# Patient Record
Sex: MALE | Race: WHITE
[De-identification: names, ages, dates, MRNs, and addresses within clinical notes are randomized per-mention and may not be internally consistent; named-entity substitution may affect disease eponyms.]

---

## 2019-08-28 ENCOUNTER — HOSPITAL ENCOUNTER (OUTPATIENT)
Dept: HOSPITAL 53 - M SDC | Age: 19
Discharge: HOME | End: 2019-08-28
Attending: DENTIST
Payer: COMMERCIAL

## 2019-08-28 VITALS — BODY MASS INDEX: 31.55 KG/M2 | WEIGHT: 213 LBS | HEIGHT: 69 IN

## 2019-08-28 VITALS — DIASTOLIC BLOOD PRESSURE: 75 MMHG | SYSTOLIC BLOOD PRESSURE: 131 MMHG

## 2019-08-28 DIAGNOSIS — K01.1: Primary | ICD-10-CM

## 2019-08-28 DIAGNOSIS — Z88.0: ICD-10-CM

## 2019-08-28 DIAGNOSIS — Z79.899: ICD-10-CM

## 2019-08-28 DIAGNOSIS — K21.9: ICD-10-CM

## 2019-08-28 PROCEDURE — 88300 SURGICAL PATH GROSS: CPT

## 2019-08-29 NOTE — RO
DATE OF PROCEDURE:  08/28/2019

 

PREOPERATIVE DIAGNOSIS:  Impacted teeth.

 

POSTOPERATIVE DIAGNOSIS:  Impacted teeth.

 

PROCEDURE PERFORMED:  Extraction of teeth 1, 16, 17, 32.

 

SURGEON:  Chilango Reid DMD

 

ASSISTANT:

 

ANESTHESIA:  General.

 

ESTIMATED BLOOD LOSS:  10 mL.

 

COMPLICATIONS:  None.

 

SPECIMEN:  Teeth

 

DESCRIPTION OF PROCEDURE: